# Patient Record
Sex: FEMALE | Race: WHITE | Employment: FULL TIME
[De-identification: names, ages, dates, MRNs, and addresses within clinical notes are randomized per-mention and may not be internally consistent; named-entity substitution may affect disease eponyms.]

---

## 2017-01-26 ENCOUNTER — TELEPHONE (OUTPATIENT)
Dept: PODIATRY | Facility: CLINIC | Age: 37
End: 2017-01-26

## 2017-01-26 ENCOUNTER — OFFICE VISIT (OUTPATIENT)
Dept: PODIATRY | Facility: CLINIC | Age: 37
End: 2017-01-26

## 2017-01-26 VITALS
DIASTOLIC BLOOD PRESSURE: 60 MMHG | HEIGHT: 63 IN | WEIGHT: 120 LBS | BODY MASS INDEX: 21.26 KG/M2 | SYSTOLIC BLOOD PRESSURE: 113 MMHG | HEART RATE: 81 BPM

## 2017-01-26 DIAGNOSIS — Z98.890 POST-OPERATIVE STATE: ICD-10-CM

## 2017-01-26 DIAGNOSIS — M79.671 PAIN IN RIGHT FOOT: ICD-10-CM

## 2017-01-26 DIAGNOSIS — M20.11 HALLUX ABDUCTO VALGUS, RIGHT: Primary | ICD-10-CM

## 2017-01-26 PROCEDURE — 73630 X-RAY EXAM OF FOOT: CPT | Performed by: PODIATRIST

## 2017-01-26 PROCEDURE — 99024 POSTOP FOLLOW-UP VISIT: CPT | Performed by: PODIATRIST

## 2017-01-31 ENCOUNTER — TELEPHONE (OUTPATIENT)
Dept: PODIATRY | Facility: CLINIC | Age: 37
End: 2017-01-31

## 2017-02-01 RX ORDER — OXYCODONE HYDROCHLORIDE AND ACETAMINOPHEN 5; 325 MG/1; MG/1
1 TABLET ORAL EVERY 6 HOURS PRN
Qty: 40 TABLET | Refills: 0 | Status: SHIPPED | OUTPATIENT
Start: 2017-02-01 | End: 2017-02-08

## 2017-02-06 ENCOUNTER — OFFICE VISIT (OUTPATIENT)
Dept: PODIATRY | Facility: CLINIC | Age: 37
End: 2017-02-06

## 2017-02-06 VITALS
BODY MASS INDEX: 21.26 KG/M2 | SYSTOLIC BLOOD PRESSURE: 108 MMHG | WEIGHT: 120 LBS | HEART RATE: 90 BPM | HEIGHT: 63 IN | DIASTOLIC BLOOD PRESSURE: 65 MMHG

## 2017-02-06 DIAGNOSIS — Z98.890 POST-OPERATIVE STATE: ICD-10-CM

## 2017-02-06 DIAGNOSIS — M20.11 HALLUX ABDUCTO VALGUS, RIGHT: Primary | ICD-10-CM

## 2017-02-06 PROCEDURE — 99024 POSTOP FOLLOW-UP VISIT: CPT | Performed by: PODIATRIST

## 2017-02-07 ENCOUNTER — TELEPHONE (OUTPATIENT)
Dept: PODIATRY | Facility: CLINIC | Age: 37
End: 2017-02-07

## 2017-02-22 ENCOUNTER — OFFICE VISIT (OUTPATIENT)
Dept: PODIATRY | Facility: CLINIC | Age: 37
End: 2017-02-22

## 2017-02-22 VITALS
WEIGHT: 120 LBS | HEART RATE: 75 BPM | HEIGHT: 63 IN | SYSTOLIC BLOOD PRESSURE: 108 MMHG | DIASTOLIC BLOOD PRESSURE: 67 MMHG | BODY MASS INDEX: 21.26 KG/M2

## 2017-02-22 DIAGNOSIS — Z98.890 POST-OPERATIVE STATE: ICD-10-CM

## 2017-02-22 DIAGNOSIS — M20.11 HALLUX ABDUCTO VALGUS, RIGHT: Primary | ICD-10-CM

## 2017-02-22 PROCEDURE — 99024 POSTOP FOLLOW-UP VISIT: CPT | Performed by: PODIATRIST

## 2017-03-15 ENCOUNTER — OFFICE VISIT (OUTPATIENT)
Dept: PODIATRY | Age: 37
End: 2017-03-15

## 2017-03-15 VITALS
WEIGHT: 120 LBS | DIASTOLIC BLOOD PRESSURE: 65 MMHG | HEART RATE: 94 BPM | SYSTOLIC BLOOD PRESSURE: 115 MMHG | HEIGHT: 63 IN | BODY MASS INDEX: 21.26 KG/M2

## 2017-03-15 DIAGNOSIS — Z98.890 POST-OPERATIVE STATE: ICD-10-CM

## 2017-03-15 DIAGNOSIS — M20.11 HALLUX ABDUCTO VALGUS, RIGHT: Primary | ICD-10-CM

## 2017-03-15 PROCEDURE — 99024 POSTOP FOLLOW-UP VISIT: CPT | Performed by: PODIATRIST

## 2017-04-12 ENCOUNTER — OFFICE VISIT (OUTPATIENT)
Dept: PODIATRY | Age: 37
End: 2017-04-12

## 2017-04-12 VITALS
WEIGHT: 120 LBS | DIASTOLIC BLOOD PRESSURE: 60 MMHG | HEIGHT: 63 IN | BODY MASS INDEX: 21.26 KG/M2 | SYSTOLIC BLOOD PRESSURE: 104 MMHG | HEART RATE: 81 BPM

## 2017-04-12 DIAGNOSIS — M79.671 PAIN IN RIGHT FOOT: ICD-10-CM

## 2017-04-12 DIAGNOSIS — Z98.890 POST-OPERATIVE STATE: ICD-10-CM

## 2017-04-12 DIAGNOSIS — M20.11 HALLUX ABDUCTO VALGUS, RIGHT: Primary | ICD-10-CM

## 2017-04-12 PROCEDURE — 99024 POSTOP FOLLOW-UP VISIT: CPT | Performed by: PODIATRIST

## 2017-05-11 ENCOUNTER — OFFICE VISIT (OUTPATIENT)
Dept: PODIATRY | Age: 37
End: 2017-05-11
Payer: COMMERCIAL

## 2017-05-11 VITALS
WEIGHT: 120 LBS | HEART RATE: 80 BPM | HEIGHT: 63 IN | DIASTOLIC BLOOD PRESSURE: 70 MMHG | SYSTOLIC BLOOD PRESSURE: 108 MMHG | BODY MASS INDEX: 21.26 KG/M2

## 2017-05-11 DIAGNOSIS — Z98.890 POST-OPERATIVE STATE: ICD-10-CM

## 2017-05-11 DIAGNOSIS — M20.11 HALLUX ABDUCTO VALGUS, RIGHT: Primary | ICD-10-CM

## 2017-05-11 PROCEDURE — 99213 OFFICE O/P EST LOW 20 MIN: CPT | Performed by: PODIATRIST

## 2017-06-14 ENCOUNTER — OFFICE VISIT (OUTPATIENT)
Dept: PODIATRY | Age: 37
End: 2017-06-14
Payer: COMMERCIAL

## 2017-06-14 VITALS
SYSTOLIC BLOOD PRESSURE: 121 MMHG | BODY MASS INDEX: 21.26 KG/M2 | DIASTOLIC BLOOD PRESSURE: 98 MMHG | WEIGHT: 120 LBS | HEART RATE: 78 BPM | HEIGHT: 63 IN

## 2017-06-14 DIAGNOSIS — R52 PAINFUL SCAR: ICD-10-CM

## 2017-06-14 DIAGNOSIS — M20.11 HALLUX ABDUCTO VALGUS, RIGHT: Primary | ICD-10-CM

## 2017-06-14 DIAGNOSIS — L90.5 PAINFUL SCAR: ICD-10-CM

## 2017-06-14 PROCEDURE — 99213 OFFICE O/P EST LOW 20 MIN: CPT | Performed by: PODIATRIST

## 2017-07-18 ENCOUNTER — HOSPITAL ENCOUNTER (OUTPATIENT)
Age: 37
Setting detail: SPECIMEN
Discharge: HOME | End: 2017-07-18
Payer: COMMERCIAL

## 2017-07-18 ENCOUNTER — OFFICE VISIT (OUTPATIENT)
Dept: OBGYN CLINIC | Age: 37
End: 2017-07-18
Payer: COMMERCIAL

## 2017-07-18 VITALS
HEART RATE: 76 BPM | DIASTOLIC BLOOD PRESSURE: 66 MMHG | BODY MASS INDEX: 21.09 KG/M2 | WEIGHT: 119 LBS | SYSTOLIC BLOOD PRESSURE: 102 MMHG | RESPIRATION RATE: 18 BRPM | HEIGHT: 63 IN

## 2017-07-18 DIAGNOSIS — Z30.431 IUD CHECK UP: ICD-10-CM

## 2017-07-18 DIAGNOSIS — Z12.4 CERVICAL CANCER SCREENING: ICD-10-CM

## 2017-07-18 DIAGNOSIS — Z01.419 WELL WOMAN EXAM: Primary | ICD-10-CM

## 2017-07-18 PROCEDURE — 99395 PREV VISIT EST AGE 18-39: CPT | Performed by: SPECIALIST

## 2017-07-18 ASSESSMENT — ENCOUNTER SYMPTOMS
NAUSEA: 0
CONSTIPATION: 0
VOMITING: 0
COUGH: 0
ABDOMINAL PAIN: 0
EYE PAIN: 0
DIARRHEA: 0
APNEA: 0
ABDOMINAL DISTENTION: 0

## 2017-07-20 LAB — CYTOLOGY REPORT: NORMAL

## 2017-07-25 ENCOUNTER — OFFICE VISIT (OUTPATIENT)
Dept: PODIATRY | Age: 37
End: 2017-07-25
Payer: COMMERCIAL

## 2017-07-25 VITALS
DIASTOLIC BLOOD PRESSURE: 68 MMHG | HEIGHT: 63 IN | WEIGHT: 120 LBS | HEART RATE: 79 BPM | BODY MASS INDEX: 21.26 KG/M2 | SYSTOLIC BLOOD PRESSURE: 108 MMHG

## 2017-07-25 DIAGNOSIS — R52 PAINFUL SCAR: ICD-10-CM

## 2017-07-25 DIAGNOSIS — L90.5 PAINFUL SCAR: ICD-10-CM

## 2017-07-25 DIAGNOSIS — M20.11 HALLUX ABDUCTO VALGUS, RIGHT: Primary | ICD-10-CM

## 2017-07-25 PROCEDURE — 99212 OFFICE O/P EST SF 10 MIN: CPT | Performed by: PODIATRIST

## 2017-07-25 ASSESSMENT — ENCOUNTER SYMPTOMS
VOMITING: 0
DIARRHEA: 0
CONSTIPATION: 0
NAUSEA: 0
COLOR CHANGE: 0

## 2018-07-24 ENCOUNTER — HOSPITAL ENCOUNTER (OUTPATIENT)
Age: 38
Setting detail: SPECIMEN
Discharge: HOME | End: 2018-07-24
Payer: COMMERCIAL

## 2018-07-24 ENCOUNTER — OFFICE VISIT (OUTPATIENT)
Dept: OBGYN CLINIC | Age: 38
End: 2018-07-24
Payer: COMMERCIAL

## 2018-07-24 VITALS
HEIGHT: 63 IN | BODY MASS INDEX: 22.68 KG/M2 | HEART RATE: 79 BPM | DIASTOLIC BLOOD PRESSURE: 68 MMHG | SYSTOLIC BLOOD PRESSURE: 111 MMHG | RESPIRATION RATE: 20 BRPM | WEIGHT: 128 LBS

## 2018-07-24 DIAGNOSIS — Z01.419 WOMEN'S ANNUAL ROUTINE GYNECOLOGICAL EXAMINATION: Primary | ICD-10-CM

## 2018-07-24 DIAGNOSIS — Z12.39 BREAST CANCER SCREENING: ICD-10-CM

## 2018-07-24 DIAGNOSIS — Z12.4 PAP SMEAR FOR CERVICAL CANCER SCREENING: ICD-10-CM

## 2018-07-24 PROCEDURE — 99396 PREV VISIT EST AGE 40-64: CPT | Performed by: CLINICAL NURSE SPECIALIST

## 2018-07-24 NOTE — PROGRESS NOTES
[address] OB GYN  [facility] [address]  [address] [address]  Dept: [phone]        DATE OF VISIT:  18        History and Physical    Kimberly Hawk    :  1980  CHIEF COMPLAINT:    Chief Complaint   Patient presents with    Annual Exam     Pt is here today for annual exam. She is not having any problems at this time. Pt denies STD testing. HPI :   Kimberly Hawk is a 34 y.o. female who presents for annual well woman exam with pap, declined STD screening. The patient was seen and examined. Per the patient bowels are regular. She has no voiding complaints.   She denies any bloating as well as vaginal discharge.  _____________________________________________________________________  Past Medical History:   Diagnosis Date    Chronic headaches     Hallux valgus of right foot     Prolonged emergence from general anesthesia     AS A CHILD AFTER TONSILLECTOMY    Sesamoiditis     right foot                                                                   Past Surgical History:   Procedure Laterality Date    BUNIONECTOMY Right 10/5/15    silver; tibial sesamoidectomy right foot    BUNIONECTOMY Right 2017    marcela,akin and avulsion of right great toe nail    NERVE BLOCK      had ten done multiple dates, young age, right arm    OTHER SURGICAL HISTORY  2015    IUD removal    TOENAIL EXCISION Bilateral years ago    Hallux nail, great toes    TONSILLECTOMY AND ADENOIDECTOMY      TONSILLECTOMY AND ADENOIDECTOMY       Family History   Problem Relation Age of Onset    Colon Polyps Mother     Emphysema Father     Diabetes Maternal Grandmother     High Blood Pressure Maternal Grandmother     Diabetes Paternal Grandfather     High Blood Pressure Paternal Grandfather     Cancer Paternal Grandfather         unsure of type    Breast Cancer Maternal Aunt     Colon Cancer Maternal Aunt      History   Smoking Status    Never Smoker

## 2018-07-25 LAB
HPV SAMPLE: NORMAL
HPV SOURCE: NORMAL
HPV, GENOTYPE 16: NOT DETECTED
HPV, GENOTYPE 18: NOT DETECTED
HPV, HIGH RISK OTHER: NOT DETECTED
HPV, INTERPRETATION: NORMAL

## 2018-08-08 ENCOUNTER — TELEPHONE (OUTPATIENT)
Dept: OBGYN CLINIC | Age: 38
End: 2018-08-08

## 2018-08-08 LAB — CYTOLOGY REPORT: NORMAL

## 2018-08-08 RX ORDER — NORETHINDRONE ACETATE AND ETHINYL ESTRADIOL 1MG-20(21)
1 KIT ORAL DAILY
Qty: 1 PACKET | Refills: 3 | Status: SHIPPED | OUTPATIENT
Start: 2018-08-08 | End: 2021-11-30

## 2018-10-08 ENCOUNTER — HOSPITAL ENCOUNTER (OUTPATIENT)
Dept: WOMENS IMAGING | Age: 38
Discharge: HOME | End: 2018-10-10
Payer: COMMERCIAL

## 2018-10-08 DIAGNOSIS — Z12.39 BREAST CANCER SCREENING: ICD-10-CM

## 2018-10-08 DIAGNOSIS — Z01.419 WOMEN'S ANNUAL ROUTINE GYNECOLOGICAL EXAMINATION: ICD-10-CM

## 2018-10-08 PROCEDURE — 77063 BREAST TOMOSYNTHESIS BI: CPT

## 2019-08-01 ENCOUNTER — OFFICE VISIT (OUTPATIENT)
Dept: OBGYN CLINIC | Age: 39
End: 2019-08-01
Payer: COMMERCIAL

## 2019-08-01 ENCOUNTER — HOSPITAL ENCOUNTER (OUTPATIENT)
Age: 39
Setting detail: SPECIMEN
Discharge: HOME | End: 2019-08-01
Payer: COMMERCIAL

## 2019-08-01 VITALS
DIASTOLIC BLOOD PRESSURE: 65 MMHG | HEIGHT: 63 IN | HEART RATE: 76 BPM | WEIGHT: 133 LBS | SYSTOLIC BLOOD PRESSURE: 113 MMHG | BODY MASS INDEX: 23.57 KG/M2

## 2019-08-01 DIAGNOSIS — Z12.39 SCREENING FOR BREAST CANCER: ICD-10-CM

## 2019-08-01 DIAGNOSIS — Z30.431 IUD CHECK UP: ICD-10-CM

## 2019-08-01 DIAGNOSIS — Z01.419 WELL WOMAN EXAM: Primary | ICD-10-CM

## 2019-08-01 PROCEDURE — 99386 PREV VISIT NEW AGE 40-64: CPT | Performed by: SPECIALIST

## 2019-08-01 ASSESSMENT — ENCOUNTER SYMPTOMS
DIARRHEA: 0
APNEA: 0
VOMITING: 0
ABDOMINAL DISTENTION: 0
NAUSEA: 0
CONSTIPATION: 0
EYE PAIN: 0
ABDOMINAL PAIN: 0
COUGH: 0

## 2019-08-06 LAB — CYTOLOGY REPORT: NORMAL

## 2019-08-27 ENCOUNTER — TELEPHONE (OUTPATIENT)
Dept: OBGYN CLINIC | Age: 39
End: 2019-08-27

## 2019-08-27 DIAGNOSIS — N89.8 VAGINA ITCHING: ICD-10-CM

## 2019-08-27 DIAGNOSIS — N89.8 VAGINAL DISCHARGE: Primary | ICD-10-CM

## 2019-08-27 RX ORDER — METRONIDAZOLE 500 MG/1
500 TABLET ORAL 2 TIMES DAILY
Qty: 14 TABLET | Refills: 0 | Status: SHIPPED | OUTPATIENT
Start: 2019-08-27 | End: 2019-09-03

## 2019-08-27 RX ORDER — FLUCONAZOLE 100 MG/1
100 TABLET ORAL DAILY
Qty: 7 TABLET | Refills: 0 | Status: SHIPPED | OUTPATIENT
Start: 2019-08-27 | End: 2019-09-03

## 2019-10-24 ENCOUNTER — HOSPITAL ENCOUNTER (OUTPATIENT)
Dept: WOMENS IMAGING | Age: 39
Discharge: HOME | End: 2019-10-26
Payer: COMMERCIAL

## 2019-10-24 DIAGNOSIS — Z12.39 SCREENING FOR BREAST CANCER: ICD-10-CM

## 2019-10-24 PROCEDURE — 77063 BREAST TOMOSYNTHESIS BI: CPT

## 2019-11-14 ENCOUNTER — OFFICE VISIT (OUTPATIENT)
Dept: PODIATRY | Age: 39
End: 2019-11-14
Payer: COMMERCIAL

## 2019-11-14 VITALS — HEIGHT: 63 IN | BODY MASS INDEX: 23.57 KG/M2 | WEIGHT: 133 LBS

## 2019-11-14 DIAGNOSIS — M20.11 HALLUX VALGUS OF RIGHT FOOT: Primary | ICD-10-CM

## 2019-11-14 DIAGNOSIS — M25.80 SESAMOIDITIS: ICD-10-CM

## 2019-11-14 DIAGNOSIS — M79.671 PAIN IN RIGHT FOOT: ICD-10-CM

## 2019-11-14 PROCEDURE — G8484 FLU IMMUNIZE NO ADMIN: HCPCS | Performed by: PODIATRIST

## 2019-11-14 PROCEDURE — 1036F TOBACCO NON-USER: CPT | Performed by: PODIATRIST

## 2019-11-14 PROCEDURE — G8420 CALC BMI NORM PARAMETERS: HCPCS | Performed by: PODIATRIST

## 2019-11-14 PROCEDURE — G8427 DOCREV CUR MEDS BY ELIG CLIN: HCPCS | Performed by: PODIATRIST

## 2019-11-14 PROCEDURE — 99213 OFFICE O/P EST LOW 20 MIN: CPT | Performed by: PODIATRIST

## 2019-11-14 ASSESSMENT — ENCOUNTER SYMPTOMS
CONSTIPATION: 0
VOMITING: 0
COLOR CHANGE: 0
DIARRHEA: 0
NAUSEA: 0

## 2020-09-08 ENCOUNTER — TELEPHONE (OUTPATIENT)
Dept: OBGYN CLINIC | Age: 40
End: 2020-09-08

## 2020-09-08 RX ORDER — METRONIDAZOLE 500 MG/1
500 TABLET ORAL 2 TIMES DAILY
Qty: 14 TABLET | Refills: 0 | Status: SHIPPED | OUTPATIENT
Start: 2020-09-08 | End: 2020-09-15

## 2020-09-08 RX ORDER — FLUCONAZOLE 100 MG/1
100 TABLET ORAL DAILY
Qty: 7 TABLET | Refills: 0 | Status: SHIPPED | OUTPATIENT
Start: 2020-09-08 | End: 2022-07-27 | Stop reason: SDUPTHER

## 2020-10-01 ENCOUNTER — TELEPHONE (OUTPATIENT)
Dept: OBGYN CLINIC | Age: 40
End: 2020-10-01

## 2020-10-12 ENCOUNTER — OFFICE VISIT (OUTPATIENT)
Dept: OBGYN CLINIC | Age: 40
End: 2020-10-12
Payer: COMMERCIAL

## 2020-10-12 ENCOUNTER — HOSPITAL ENCOUNTER (OUTPATIENT)
Age: 40
Setting detail: SPECIMEN
Discharge: HOME | End: 2020-10-12
Payer: COMMERCIAL

## 2020-10-12 VITALS
HEIGHT: 63 IN | BODY MASS INDEX: 23.21 KG/M2 | DIASTOLIC BLOOD PRESSURE: 69 MMHG | HEART RATE: 72 BPM | WEIGHT: 131 LBS | TEMPERATURE: 98.2 F | SYSTOLIC BLOOD PRESSURE: 108 MMHG

## 2020-10-12 PROBLEM — Z12.31 ENCOUNTER FOR SCREENING MAMMOGRAM FOR MALIGNANT NEOPLASM OF BREAST: Status: ACTIVE | Noted: 2020-10-12

## 2020-10-12 PROBLEM — Z01.419 WOMEN'S ANNUAL ROUTINE GYNECOLOGICAL EXAMINATION: Status: ACTIVE | Noted: 2020-10-12

## 2020-10-12 PROCEDURE — G8484 FLU IMMUNIZE NO ADMIN: HCPCS | Performed by: SPECIALIST

## 2020-10-12 PROCEDURE — 99396 PREV VISIT EST AGE 40-64: CPT | Performed by: SPECIALIST

## 2020-10-13 ENCOUNTER — TELEPHONE (OUTPATIENT)
Dept: OBGYN CLINIC | Age: 40
End: 2020-10-13

## 2020-10-13 ASSESSMENT — ENCOUNTER SYMPTOMS
APNEA: 0
ABDOMINAL PAIN: 0
DIARRHEA: 0
ABDOMINAL DISTENTION: 0
NAUSEA: 0
CONSTIPATION: 0
VOMITING: 0
COUGH: 0
EYE PAIN: 0

## 2020-10-13 NOTE — TELEPHONE ENCOUNTER
Faxed received from [address] [place] [address].   Diagnosis on mammogram orders needs to be changed to abnormal mammogram.  Thanks

## 2020-10-13 NOTE — PROGRESS NOTES
Subjective:      Patient ID: Raymundo Lesch is a 41 y.o. female. Chief Complaint   Patient presents with   [facility] Gynecologic Exam     Patient is here for pap today      /69 (Site: Right Upper Arm, Position: Sitting, Cuff Size: Medium Adult)   Pulse 72   Temp 98.2 °F (36.8 °C) (Temporal)   Ht 5' 3\" (1.6 m)   Wt 131 lb (59.4 kg)   BMI 23.21 kg/m²   No LMP recorded.     [id number]    Past Medical History:   Diagnosis Date    Chronic headaches     Hallux valgus of right foot     Prolonged emergence from general anesthesia     AS A CHILD AFTER TONSILLECTOMY    Sesamoiditis     right foot     Current Outpatient Medications Ordered in Epic   Medication Sig Dispense Refill    PARAGARD INTRAUTERINE COPPER IUD by Intrauterine route      norethindrone-ethinyl estradiol (LOESTRIN FE 1/20) 1-20 MG-MCG per tablet Take 1 tablet by mouth daily (Patient not taking: Reported on 11/14/2019) 1 packet 3     Current Facility-Administered Medications Ordered in Epic   Medication Dose Route Frequency Provider Last Rate Last Dose    bupivacaine (MARCAINE) 0.5 % injection 5 mg  1 mL Intradermal Once Wilhelmenia Westmoreland, DPM        Dexamethasone Sodium Phosphate injection 6 mg  6 mg Intra-articular Once Wilhelmenia Westmoreland, DPM        MethylPREDNISolone Acetate SUSP 10 mg  10 mg Injection Once Wilhelmenia Mynor, DPM         Problem List Items Addressed This Visit     Encounter for screening mammogram for malignant neoplasm of breast - Primary    Relevant Orders    MERON DIGITAL SCREEN W OR WO CAD BILATERAL      Other Visit Diagnoses     IUD check up            Allergies   Allergen Reactions    Bactrim [Sulfamethoxazole-Trimethoprim]     Hibiclens [Chlorhexidine Gluconate] Hives    Minocycline Hives    Penicillins     Iodine Itching and Rash     Orders Placed This Encounter   Procedures    MERON DIGITAL SCREEN W OR WO CAD BILATERAL     Standing Status:   Future     Standing Expiration Date:   12/11/2021     Order Specific Question:   Reason for exam:     Answer:   screen for breast cancer    PAP Smear     Patient History:    No LMP recorded. OBGYN Status: Having periods  Past Surgical History:  10/5/15: BUNIONECTOMY; Right      Comment:  silver; tibial sesamoidectomy right foot  01/23/2017: BUNIONECTOMY; Right      Comment:  marcela,akin and avulsion of right great toe nail  No date: NERVE BLOCK      Comment:  had ten done multiple dates, young age, right arm  Feb 2015: OTHER SURGICAL HISTORY      Comment:  IUD removal  years ago: TOENAIL EXCISION; Bilateral      Comment:  Hallux nail, great toes  No date: TONSILLECTOMY AND ADENOIDECTOMY  No date: TONSILLECTOMY AND ADENOIDECTOMY  Medications/Contraceptives Affecting Cytology     Combination Contraceptives - Oral Disp Start End     norethindrone-ethinyl estradiol ([address] 1/20) 1-20 MG-MCG per   tablet    1 packet 8/8/2018     Sig: Take 1 tablet by mouth daily    Patient not taking:  Reported on 11/14/2019       Route: Oral        Social History    Tobacco Use      Smoking status: Never Smoker      Smokeless tobacco: Never Used       Standing Status:   Future     Standing Expiration Date:   10/12/2021     Order Specific Question:   Collection Type     Answer: Thin Prep     Order Specific Question:   Prior Abnormal Pap Test     Answer:   No     Order Specific Question:   Screening or Diagnostic     Answer:   Screening     Order Specific Question:   HPV Requested? Answer:   Yes     Order Specific Question:   High Risk Patient     Answer:   N/A        Patient is here for her annual exam today. Her last pap smear in August 2019 was negative. She uses birth control pills. She is not a smoker. Review of Systems   Constitutional: Negative for activity change, appetite change and fever. HENT: Negative for ear discharge and ear pain. Eyes: Negative for pain and visual disturbance. Respiratory: Negative for apnea and cough.     Cardiovascular: Negative for chest pain, palpitations and leg swelling. Gastrointestinal: Negative for abdominal distention, abdominal pain, constipation, diarrhea, nausea and vomiting. Endocrine: Negative. Genitourinary: Negative for difficulty urinating, dysuria, menstrual problem and pelvic pain. Musculoskeletal: Negative for neck pain and neck stiffness. Skin: Negative. Neurological: Negative for light-headedness and numbness. Hematological: Negative. Does not bruise/bleed easily. Objective:   Physical Exam  Vitals signs and nursing note reviewed. Exam conducted with a chaperone present. Constitutional:       Appearance: She is well-developed. HENT:      Head: Normocephalic and atraumatic. Neck:      Thyroid: No thyroid mass or thyromegaly. Cardiovascular:      Rate and Rhythm: Normal rate and regular rhythm. Pulmonary:      Effort: Pulmonary effort is normal.      Breath sounds: Normal breath sounds. Chest:      Breasts:         Right: Normal. No inverted nipple, mass, nipple discharge, skin change or tenderness. Left: Normal. No inverted nipple, mass, nipple discharge, skin change or tenderness. Abdominal:      General: Bowel sounds are normal. There is no distension. Palpations: Abdomen is soft. There is no mass. Tenderness: There is no abdominal tenderness. There is no guarding or rebound. Hernia: There is no hernia in the left inguinal area. Genitourinary:     General: Normal vulva. Exam position: Supine. Labia:         Right: No rash or lesion. Left: No rash or lesion. Vagina: Normal. No signs of injury. No vaginal discharge or tenderness. Cervix: No cervical motion tenderness or discharge. Uterus: Normal. Not enlarged, not fixed and not tender. Adnexa: Right adnexa normal and left adnexa normal.        Right: No mass or tenderness. Left: No mass or tenderness. Rectum: Normal. No mass or anal fissure. Normal anal tone. Comments: IUD strings visualized  Musculoskeletal: Normal range of motion. General: No tenderness. Skin:     General: Skin is warm and dry. Neurological:      Mental Status: She is alert and oriented to person, place, and time. Psychiatric:         Judgment: Judgment normal.         Assessment:      Patient with normal annual exam.   Pap smear was done. IUD strings were visualized on exam. Patient was advised to have mammogram done. Plan:      Orders Placed This Encounter   Procedures    MERON DIGITAL SCREEN W OR WO CAD BILATERAL    PAP Smear     Appointment in 1 year. Diann Singh am scribing for, and in the presence of Dr. Rafaela Jenkins. Electronically signed by: Alecia Daniels 10/15/20 3:20 PM       I agree to the above documentation placed by my scribe Alecia Daniels. I reviewed the scribe's note and agree with the documented findings and plan of care. Any areas of disagreement are noted on the chart. I have personally evaluated this patient. Additional findings are as noted. I agree with the chief complaint, past medical history, past surgical history, allergies, medications, social and family history as documented unless otherwise noted below.      Electronically signed by Rafaela Jenkins MD on 10/16/2020 at 3:37 AM

## 2020-10-16 PROBLEM — Z30.431 IUD CHECK UP: Status: ACTIVE | Noted: 2020-10-16

## 2020-10-20 LAB
CYTOLOGY REPORT: NORMAL
HPV SOURCE: NORMAL
HPV, GENOTYPE 16: NOT DETECTED
HPV, GENOTYPE 18: NOT DETECTED
HPV, HIGH RISK OTHER: NOT DETECTED

## 2020-11-11 PROBLEM — Z12.31 ENCOUNTER FOR SCREENING MAMMOGRAM FOR MALIGNANT NEOPLASM OF BREAST: Status: RESOLVED | Noted: 2020-10-12 | Resolved: 2020-11-11

## 2020-12-05 ENCOUNTER — OFFICE VISIT (OUTPATIENT)
Dept: PRIMARY CARE CLINIC | Age: 40
End: 2020-12-05
Payer: COMMERCIAL

## 2020-12-05 VITALS
OXYGEN SATURATION: 98 % | HEART RATE: 87 BPM | DIASTOLIC BLOOD PRESSURE: 88 MMHG | TEMPERATURE: 98.7 F | SYSTOLIC BLOOD PRESSURE: 144 MMHG

## 2020-12-05 PROCEDURE — 1036F TOBACCO NON-USER: CPT | Performed by: NURSE PRACTITIONER

## 2020-12-05 PROCEDURE — G8420 CALC BMI NORM PARAMETERS: HCPCS | Performed by: NURSE PRACTITIONER

## 2020-12-05 PROCEDURE — G8484 FLU IMMUNIZE NO ADMIN: HCPCS | Performed by: NURSE PRACTITIONER

## 2020-12-05 PROCEDURE — 99214 OFFICE O/P EST MOD 30 MIN: CPT | Performed by: NURSE PRACTITIONER

## 2020-12-05 PROCEDURE — G8427 DOCREV CUR MEDS BY ELIG CLIN: HCPCS | Performed by: NURSE PRACTITIONER

## 2020-12-05 PROCEDURE — 96372 THER/PROPH/DIAG INJ SC/IM: CPT | Performed by: NURSE PRACTITIONER

## 2020-12-05 RX ORDER — PREDNISONE 20 MG/1
40 TABLET ORAL DAILY
Qty: 10 TABLET | Refills: 0 | Status: SHIPPED | OUTPATIENT
Start: 2020-12-05 | End: 2020-12-10

## 2020-12-05 RX ORDER — TRIAMCINOLONE ACETONIDE 40 MG/ML
40 INJECTION, SUSPENSION INTRA-ARTICULAR; INTRAMUSCULAR ONCE
Status: COMPLETED | OUTPATIENT
Start: 2020-12-05 | End: 2020-12-05

## 2020-12-05 RX ADMIN — TRIAMCINOLONE ACETONIDE 40 MG: 40 INJECTION, SUSPENSION INTRA-ARTICULAR; INTRAMUSCULAR at 11:05

## 2020-12-05 ASSESSMENT — ENCOUNTER SYMPTOMS
ALLERGIC/IMMUNOLOGIC NEGATIVE: 1
CHEST TIGHTNESS: 0
EYES NEGATIVE: 1
ABDOMINAL PAIN: 0
SORE THROAT: 0
ALLERGIC REACTION: 1
DIARRHEA: 0
COUGH: 0
SHORTNESS OF BREATH: 0
NAUSEA: 0
EYE DISCHARGE: 0
VOMITING: 0
EYE ITCHING: 0

## 2020-12-05 ASSESSMENT — PATIENT HEALTH QUESTIONNAIRE - PHQ9
SUM OF ALL RESPONSES TO PHQ QUESTIONS 1-9: 0
SUM OF ALL RESPONSES TO PHQ QUESTIONS 1-9: 0
2. FEELING DOWN, DEPRESSED OR HOPELESS: 0
1. LITTLE INTEREST OR PLEASURE IN DOING THINGS: 0
SUM OF ALL RESPONSES TO PHQ9 QUESTIONS 1 & 2: 0
SUM OF ALL RESPONSES TO PHQ QUESTIONS 1-9: 0

## 2020-12-05 NOTE — PATIENT INSTRUCTIONS
bleeding. · Follow your doctor's instructions for how to stop taking this medicine. You may need to taper it. This means the medicine should be slowly reduced. Do not stop taking the medicine all at once. When should you call for help? Call 911 if:    · You vomit blood or what looks like coffee grounds. Call your doctor now or seek immediate medical care if:    · Your symptoms are getting worse.     · You are dizzy or lightheaded, or you feel like you may faint.     · You have new or worse nausea or vomiting.     · You have stomach pain that is getting worse.     · Your stools are black. Watch closely for changes in your health, and be sure to contact your doctor if:    · You do not get better as expected. Where can you learn more? Go to [email or web address]. org and sign in to your [employer] account. Enter J304 in the [employer] box to learn more about \"Oral Corticosteroids: Care Instructions. \"     If you do not have an account, please click on the \"Sign Up Now\" link. Current as of: February 24, 2020               Content Version: 12.6  © 4469-6032 [employer]. Care instructions adapted under license by [facility] ([place] [facility]). If you have questions about a medical condition or this instruction, always ask your healthcare professional. [address] any warranty or liability for your use of this information.

## 2020-12-05 NOTE — PROGRESS NOTES
[address]  [address] [facility] [address] [address]  Dept: [phone]  Dept Fax: [phone]    Flakita David is a 41 y.o. female who presents today forher medical conditions/complaints as noted below. Flakita David is c/o of   Chief Complaint   Patient presents with    Allergic Reaction     hives x 1 day; used essential oil on thursday, hives started friday morning      HPI:     Allergic Reaction   This is a new problem. The current episode started yesterday. The problem occurs constantly. The problem is unchanged. The problem is moderate. Associated with: essential oil  The time of exposure was  in time (Applied essentail oil on Thursday- developed hives 1 day after ) from the onset of symptoms. The exposure occurred at home. Associated symptoms include a rash. Pertinent negatives include no abdominal pain, chest pain, coughing, diarrhea, eye itching or vomiting. There is no swelling present. Past treatments include diphenhydramine (Benadryl PO and Topical ). The treatment provided mild relief.      Past Medical History:   Diagnosis Date    Chronic headaches     Hallux valgus of right foot     Prolonged emergence from general anesthesia     AS A CHILD AFTER TONSILLECTOMY    Sesamoiditis     right foot      Past Surgical History:   Procedure Laterality Date    BUNIONECTOMY Right 10/5/15    silver; tibial sesamoidectomy right foot    BUNIONECTOMY Right 01/23/2017    marcela,akin and avulsion of right great toe nail    NERVE BLOCK      had ten done multiple dates, young age, right arm    OTHER SURGICAL HISTORY  Feb 2015    IUD removal    TOENAIL EXCISION Bilateral years ago    Hallux nail, great toes    TONSILLECTOMY AND ADENOIDECTOMY      TONSILLECTOMY AND ADENOIDECTOMY         Family History   Problem Relation Age of Onset    Colon Polyps Mother     Emphysema Father     Diabetes Maternal Grandmother     High Blood Pressure Maternal Grandmother     Diabetes Paternal Grandfather     High Blood Pressure Paternal Grandfather     Cancer Paternal Grandfather         unsure of type    Breast Cancer Maternal Aunt     Colon Cancer Maternal Aunt        Social History     Tobacco Use    Smoking status: Never Smoker    Smokeless tobacco: Never Used   Substance Use Topics    Alcohol use: Yes     Comment: OCCASIONALLY      Current Outpatient Medications   Medication Sig Dispense Refill    predniSONE (DELTASONE) 20 MG tablet Take 2 tablets by mouth daily for 5 days 10 tablet 0    hydrocortisone 2.5 % ointment Apply topically 2 times daily while symptoms persist 30 g 0    PARAGARD INTRAUTERINE COPPER IUD by Intrauterine route      norethindrone-ethinyl estradiol (LOESTRIN FE 1/20) 1-20 MG-MCG per tablet Take 1 tablet by mouth daily (Patient not taking: Reported on 11/14/2019) 1 packet 3     Current Facility-Administered Medications   Medication Dose Route Frequency Provider Last Rate Last Dose    bupivacaine (MARCAINE) 0.5 % injection 5 mg  1 mL Intradermal Once Elias Villalobos DPM        Dexamethasone Sodium Phosphate injection 6 mg  6 mg Intra-articular Once Elias Villalobos DPM        MethylPREDNISolone Acetate SUSP 10 mg  10 mg Injection Once Elias Villalobos DPM            Allergies   Allergen Reactions    Bactrim [Sulfamethoxazole-Trimethoprim]     Hibiclens [Chlorhexidine Gluconate] Hives    Minocycline Hives    Penicillins     Iodine Itching and Rash           Subjective:      Review of Systems   Constitutional: Negative for appetite change, chills, fatigue and fever. HENT: Negative for congestion, postnasal drip and sore throat. Eyes: Negative. Negative for discharge and itching. Respiratory: Negative for cough, chest tightness and shortness of breath. Cardiovascular: Negative for chest pain, palpitations and leg swelling. Gastrointestinal: Negative for abdominal pain, diarrhea, nausea and vomiting. normal and symmetric. Psychiatric:         Thought Content: Thought content normal.       BP (!) 144/88 (Site: Left Upper Arm, Position: Sitting, Cuff Size: Medium Adult)   Pulse 87   Temp 98.7 °F (37.1 °C) (Oral)   SpO2 98%     Assessment:       Diagnosis Orders   1. Allergic urticaria  triamcinolone acetonide (KENALOG-40) injection 40 mg   2. Rash  predniSONE (DELTASONE) 20 MG tablet    hydrocortisone 2.5 % ointment    triamcinolone acetonide (KENALOG-40) injection 40 mg           Plan:     1.) Kenalog IM injection in office   2.) Start Prednisone today   3.) Hydrocortisone PRN   4.) Watch for secondary skin infection - if occurs will RTO   5.) Continue with Benadryl PRN while symptoms persist     Problem List     None           Patient given educationalmaterials - see patient instructions. Discussed use, benefit, and side effectsof prescribed medications. All patient questions answered. Pt verbalized understanding. Instructed to continue current medications, diet and exercise. Patient agreedwith treatment plan. Follow up as directed.      Electronically signed by ABEL Infante CNP on 12/5/2020 at 11:15 AM

## 2020-12-07 ENCOUNTER — TELEPHONE (OUTPATIENT)
Dept: PRIMARY CARE CLINIC | Age: 40
End: 2020-12-07

## 2020-12-07 RX ORDER — HYDROXYZINE HYDROCHLORIDE 25 MG/1
25 TABLET, FILM COATED ORAL EVERY 8 HOURS PRN
Qty: 30 TABLET | Refills: 0 | Status: SHIPPED | OUTPATIENT
Start: 2020-12-07 | End: 2020-12-17

## 2020-12-07 NOTE — TELEPHONE ENCOUNTER
Patient states she is still extremely itchy from the hives due to an allergic reaction that she was seen for on 12/5/2020. Patient is wondering if she needs to be re-seen for it, or what she should do. Please advise.

## 2020-12-07 NOTE — TELEPHONE ENCOUNTER
Patient would like that sent to [facility] on [facility]. She said the hives are slightly better, but do worsen again at night and when showering. They are not spreading.

## 2020-12-07 NOTE — TELEPHONE ENCOUNTER
I can RX Atarax for her for itching. She can take this instead of Benadryl. Are the hives spreading? Did they improve at all?   If the atarax doesn't help- she may need to be reevaluated

## 2021-01-07 ENCOUNTER — HOSPITAL ENCOUNTER (OUTPATIENT)
Dept: WOMENS IMAGING | Age: 41
Discharge: HOME | End: 2021-01-09
Payer: COMMERCIAL

## 2021-01-07 DIAGNOSIS — Z12.31 ENCOUNTER FOR SCREENING MAMMOGRAM FOR MALIGNANT NEOPLASM OF BREAST: ICD-10-CM

## 2021-01-07 PROCEDURE — 77063 BREAST TOMOSYNTHESIS BI: CPT

## 2021-11-30 ENCOUNTER — OFFICE VISIT (OUTPATIENT)
Dept: OBGYN CLINIC | Age: 41
End: 2021-11-30
Payer: COMMERCIAL

## 2021-11-30 ENCOUNTER — HOSPITAL ENCOUNTER (OUTPATIENT)
Age: 41
Setting detail: SPECIMEN
Discharge: HOME | End: 2021-11-30

## 2021-11-30 VITALS
HEART RATE: 74 BPM | HEIGHT: 63 IN | BODY MASS INDEX: 24.27 KG/M2 | SYSTOLIC BLOOD PRESSURE: 110 MMHG | DIASTOLIC BLOOD PRESSURE: 78 MMHG | WEIGHT: 137 LBS

## 2021-11-30 DIAGNOSIS — Z11.51 SCREENING FOR HUMAN PAPILLOMAVIRUS: ICD-10-CM

## 2021-11-30 DIAGNOSIS — N76.0 ACUTE VAGINITIS: ICD-10-CM

## 2021-11-30 DIAGNOSIS — Z12.31 ENCOUNTER FOR SCREENING MAMMOGRAM FOR BREAST CANCER: ICD-10-CM

## 2021-11-30 DIAGNOSIS — Z01.419 PAP SMEAR, AS PART OF ROUTINE GYNECOLOGICAL EXAMINATION: Primary | ICD-10-CM

## 2021-11-30 PROCEDURE — G8484 FLU IMMUNIZE NO ADMIN: HCPCS | Performed by: CLINICAL NURSE SPECIALIST

## 2021-11-30 PROCEDURE — 99396 PREV VISIT EST AGE 40-64: CPT | Performed by: CLINICAL NURSE SPECIALIST

## 2021-11-30 RX ORDER — FLUCONAZOLE 150 MG/1
TABLET ORAL
Qty: 3 TABLET | Refills: 1 | Status: SHIPPED | OUTPATIENT
Start: 2021-11-30 | End: 2022-01-17 | Stop reason: ALTCHOICE

## 2021-11-30 RX ORDER — PAROXETINE HYDROCHLORIDE 20 MG/1
TABLET, FILM COATED ORAL
COMMUNITY
Start: 2021-11-19

## 2021-11-30 NOTE — PROGRESS NOTES
[facility]  [address] [address]  Dept: [phone]        DATE OF VISIT:  21        History and Physical    Aj Rios    :  1980  CHIEF COMPLAINT:    Chief Complaint   Patient presents with    Annual Exam                    Aj Rios is a 35 y.o. female who presents for annual well woman exam.  Patient reports that she has noticed some vaginal itching and started taking some monistat over the counter. The patient was seen and examined. Per the patient bowels areregular. She has no voiding complaints. She denies any bloating as well as vaginal discharge. Chaperone for Intimate Exam   Chaperone was offered as part of the rooming process. Patient declined and agrees to continue with exam without a chaperone.    Chaperone: none     _____________________________________________________________________  Past Medical History:   Diagnosis Date    Chronic headaches     Hallux valgus of right foot     Prolonged emergence from general anesthesia     AS A CHILD AFTER TONSILLECTOMY    Sesamoiditis     right foot                                                                   Past Surgical History:   Procedure Laterality Date    BUNIONECTOMY Right 10/5/15    silver; tibial sesamoidectomy right foot    BUNIONECTOMY Right 2017    marcela,akin and avulsion of right great toe nail    NERVE BLOCK      had ten done multiple dates, young age, right arm    OTHER SURGICAL HISTORY  2015    IUD removal    TOENAIL EXCISION Bilateral years ago    Hallux nail, great toes    TONSILLECTOMY AND ADENOIDECTOMY      TONSILLECTOMY AND ADENOIDECTOMY       Family History   Problem Relation Age of Onset    Colon Polyps Mother     Emphysema Father     Diabetes Maternal Grandmother     High Blood Pressure Maternal Grandmother     Diabetes Paternal Grandfather     High Blood Pressure Paternal Grandfather     Cancer Paternal no     Respiratory:  Shortness of breath, coughing           no    Cardiovascular: Chest pain   no     Gastrointestinal: Chronic diarrhea/constipation, nausea/vomiting           no   Urogenital:  Urinary incontinence, frequency, urgency          no                                         Heavy/irregular periods           no                                      Vaginal discharge      Yellow discharge with itching       yes  Hematological: Bruises easy   no     Endocrine:  Hot flashes ocassional night sweats  no     Hot/Cold Intolerance  no    Psychological:            Mood and affect were within normal limits. no                 Physical Exam    Physical Exam:    Vitals:    11/30/21 1315   BP: 110/78   Site: Right Upper Arm   Position: Sitting   Cuff Size: Medium Adult   Pulse: 74   Weight: 137 lb (62.1 kg)   Height: 5' 3\" (1.6 m)       General Appearance: This  is a well developed, well nourished, well groomed female. Her BMI was reviewed. Nutritional decision making andexercise were discussed. Neurological:  The patient is alert and oriented to time,place, person, and situation    Skin:  A brief inspection of the skin revealed no rashes or lesions. Neck:  The neck was supple. Respiratory: There was unlabored respiratory effort. Lungs clear to ascultation. Cardiovascular: The patients extremities were without calf tenderness or edema. Heart with a regular rate and rhythm. Abdomen: The abdomen was soft and non-tender with no guarding, rebound or rigidity. No hernias were appreciated. Breast:   The patients breasts were symmetrical.  There were no masses, discharge or retractions noted. Self breast exams were reviewed. Pelvic Exam:  The external genitalia was with a normal appearance. The vaginal vault was normal. There were no cystocele, rectocele, or enterocele appreciated.  There was yellow vaginal discharge, and some discharge is like \"cottage cheese\". The cervix was without lesions. There was no cervical motion tenderness. IUD strings noted. The uterus was mobile, midline and regular. The adnexa no fullness, tenderness or masses appreciated. ASSESSMENT:     Normal annual well woman exam with vaginitis  35 y.o. Female; Annual   Diagnosis Orders   1. Pap smear, as part of routine gynecological examination  PAP SMEAR   2. Screening for human papillomavirus  PAP SMEAR   3. Encounter for screening mammogram for breast cancer  MERON DIGITAL SCREEN W OR WO CAD BILATERAL   4. Acute vaginitis  VAGINITIS DNA PROBE                     PLAN:  - Pap collected. Discussed new papsmear guidelines. - Birth control Discussed. - Smoking risk factors Discussed  - Diet and exercise reviewed. - Routine healthmaintenance per patients PCP.  - Return to clinic in 1 year or earlier with questions, problems, concerns. Return for 1 year for Annual and as needed.         Electronically signed by ABEL Paz CNP on 11/30/2021 at 1:32 PM

## 2021-12-01 DIAGNOSIS — N76.0 ACUTE VAGINITIS: ICD-10-CM

## 2021-12-01 LAB
DIRECT EXAM: NORMAL
Lab: NORMAL
SPECIMEN DESCRIPTION: NORMAL

## 2021-12-02 LAB
HPV SAMPLE: NORMAL
HPV, GENOTYPE 16: NOT DETECTED
HPV, GENOTYPE 18: NOT DETECTED
HPV, HIGH RISK OTHER: NOT DETECTED
HPV, INTERPRETATION: NORMAL
SPECIMEN DESCRIPTION: NORMAL

## 2021-12-07 LAB — CYTOLOGY REPORT: NORMAL

## 2022-01-17 ENCOUNTER — HOSPITAL ENCOUNTER (OUTPATIENT)
Dept: WOMENS IMAGING | Age: 42
Discharge: HOME | End: 2022-01-19
Payer: COMMERCIAL

## 2022-01-17 ENCOUNTER — OFFICE VISIT (OUTPATIENT)
Dept: PODIATRY | Age: 42
End: 2022-01-17

## 2022-01-17 VITALS — WEIGHT: 133 LBS | BODY MASS INDEX: 23.57 KG/M2 | HEIGHT: 63 IN

## 2022-01-17 DIAGNOSIS — M20.11 HALLUX ABDUCTOVALGUS, RIGHT: Primary | ICD-10-CM

## 2022-01-17 DIAGNOSIS — Z12.31 ENCOUNTER FOR SCREENING MAMMOGRAM FOR BREAST CANCER: ICD-10-CM

## 2022-01-17 PROCEDURE — 77063 BREAST TOMOSYNTHESIS BI: CPT

## 2022-07-27 RX ORDER — FLUCONAZOLE 100 MG/1
100 TABLET ORAL DAILY
Qty: 7 TABLET | Refills: 0 | Status: SHIPPED | OUTPATIENT
Start: 2022-07-27 | End: 2022-08-03

## 2022-10-05 ENCOUNTER — NURSE ONLY (OUTPATIENT)
Dept: PODIATRY | Age: 42
End: 2022-10-05

## 2023-01-26 ENCOUNTER — OFFICE VISIT (OUTPATIENT)
Dept: OBGYN CLINIC | Age: 43
End: 2023-01-26
Payer: COMMERCIAL

## 2023-01-26 VITALS
BODY MASS INDEX: 23.39 KG/M2 | HEIGHT: 63 IN | HEART RATE: 96 BPM | DIASTOLIC BLOOD PRESSURE: 72 MMHG | WEIGHT: 132 LBS | SYSTOLIC BLOOD PRESSURE: 118 MMHG

## 2023-01-26 DIAGNOSIS — N92.6 IRREGULAR MENSES: ICD-10-CM

## 2023-01-26 DIAGNOSIS — Z30.431 IUD CHECK UP: ICD-10-CM

## 2023-01-26 DIAGNOSIS — Z01.419 WELL WOMAN EXAM: Primary | ICD-10-CM

## 2023-01-26 DIAGNOSIS — Z12.11 SCREEN FOR COLON CANCER: ICD-10-CM

## 2023-01-26 DIAGNOSIS — Z12.31 SCREENING MAMMOGRAM FOR BREAST CANCER: ICD-10-CM

## 2023-01-26 PROCEDURE — 99396 PREV VISIT EST AGE 40-64: CPT | Performed by: CLINICAL NURSE SPECIALIST

## 2023-01-26 PROCEDURE — G8484 FLU IMMUNIZE NO ADMIN: HCPCS | Performed by: CLINICAL NURSE SPECIALIST

## 2023-01-26 RX ORDER — RIZATRIPTAN BENZOATE 10 MG/1
10 TABLET ORAL
COMMUNITY
Start: 2023-01-16

## 2023-01-26 RX ORDER — ATOGEPANT 60 MG/1
TABLET ORAL
COMMUNITY
Start: 2023-01-19

## 2023-01-26 SDOH — ECONOMIC STABILITY: FOOD INSECURITY: WITHIN THE PAST 12 MONTHS, YOU WORRIED THAT YOUR FOOD WOULD RUN OUT BEFORE YOU GOT MONEY TO BUY MORE.: NEVER TRUE

## 2023-01-26 SDOH — ECONOMIC STABILITY: FOOD INSECURITY: WITHIN THE PAST 12 MONTHS, THE FOOD YOU BOUGHT JUST DIDN'T LAST AND YOU DIDN'T HAVE MONEY TO GET MORE.: NEVER TRUE

## 2023-01-26 ASSESSMENT — PATIENT HEALTH QUESTIONNAIRE - PHQ9
SUM OF ALL RESPONSES TO PHQ QUESTIONS 1-9: 0
SUM OF ALL RESPONSES TO PHQ9 QUESTIONS 1 & 2: 0
1. LITTLE INTEREST OR PLEASURE IN DOING THINGS: 0
SUM OF ALL RESPONSES TO PHQ QUESTIONS 1-9: 0
SUM OF ALL RESPONSES TO PHQ QUESTIONS 1-9: 0
2. FEELING DOWN, DEPRESSED OR HOPELESS: 0
SUM OF ALL RESPONSES TO PHQ QUESTIONS 1-9: 0

## 2023-01-26 ASSESSMENT — SOCIAL DETERMINANTS OF HEALTH (SDOH): HOW HARD IS IT FOR YOU TO PAY FOR THE VERY BASICS LIKE FOOD, HOUSING, MEDICAL CARE, AND HEATING?: NOT HARD AT ALL

## 2023-01-26 NOTE — PROGRESS NOTES
[facility] GYN  [facility]  [address] [place] [id number]  Dept: [phone]        DATE OF VISIT:  23        History and Physical    Frankie Pepe    :  1980  CHIEF COMPLAINT:    Chief Complaint   Patient presents with    Annual Exam                    Frankie Pepe is a 37 y.o. female who presents for annual well woman exam.  Patient reports that she has been having irregular menses her last cycle was in sept. Of last year but also reports that she has had a lot of stress. The patient was seen and examined. Per the patient bowels areregular. She has no voiding complaints. She denies any bloating as well as vaginal discharge. Chaperone for Intimate Exam  Chaperone was offered as part of the rooming process. Patient declined and agrees to continue with exam without a chaperone.   Chaperone: none     _____________________________________________________________________  Past Medical History:   Diagnosis Date    Chronic headaches     Hallux valgus of right foot     Prolonged emergence from general anesthesia     AS A CHILD AFTER TONSILLECTOMY    Sesamoiditis     right foot                                                                   Past Surgical History:   Procedure Laterality Date    BUNIONECTOMY Right 10/5/15    silver; tibial sesamoidectomy right foot    BUNIONECTOMY Right 2017    marcela,akin and avulsion of right great toe nail    NERVE BLOCK      had ten done multiple dates, young age, right arm    OTHER SURGICAL HISTORY  2015    IUD removal    TOENAIL EXCISION Bilateral years ago    Hallux nail, great toes    TONSILLECTOMY AND ADENOIDECTOMY      TONSILLECTOMY AND ADENOIDECTOMY       Family History   Problem Relation Age of Onset    Colon Polyps Mother     Emphysema Father     Diabetes Maternal Grandmother     High Blood Pressure Maternal Grandmother     Diabetes Paternal Grandfather     High Blood Pressure Paternal Grandfather     Cancer Paternal Grandfather         unsure of type    Breast Cancer Maternal Aunt     Colon Cancer Maternal Aunt      Social History     Tobacco Use   Smoking Status Never   Smokeless Tobacco Never     Social History     Substance and Sexual Activity   Alcohol Use Yes    Comment: OCCASIONALLY     Current Outpatient Medications   Medication Sig Dispense Refill    QULIPTA 60 MG TABS TAKE 1 TABLET BY MOUTH IN THE MORNING      rizatriptan (MAXALT) 10 MG tablet Take 10 mg by mouth once as needed      PARAGARD INTRAUTERINE COPPER IUD by Intrauterine route       Current Facility-Administered Medications   Medication Dose Route Frequency Provider Last Rate Last Admin    bupivacaine (MARCAINE) 0.5 % injection 5 mg  1 mL IntraDERmal Once Saumya Cart, DPM        Dexamethasone Sodium Phosphate injection 6 mg  6 mg Intra-artICUlar Once Saumya Cart, DPM        MethylPREDNISolone Acetate SUSP 10 mg  10 mg Injection Once Saumya Cart, DPM           Allergies: Allergies   Allergen Reactions    Bactrim [Sulfamethoxazole-Trimethoprim]     Hibiclens [Chlorhexidine Gluconate] Hives    Minocycline Hives    Penicillins     Iodine Itching and Rash       Gynecologic History:  Patient's last menstrual period was 2022.   Sexually Active: Yes  STD History:No  Birth Control: Yes IUD     OB History    Para Term  AB Living   4 4 2 2 0 2   SAB IAB Ectopic Molar Multiple Live Births   0 0 0 0 0 2     ______________________________________________________________________    Review of Systems    REVIEW OFSYSTEMS:        Constitutional:  Unexpected weight change, extreme fatigue, night sweats              no  Skin:                           Rashes, moles   no  Neurological:  Frequent headaches hx of migraines and sees Dr. Liane Corbin, seizures         yes  Ophthalmic:  Recent visual changes no  ENT:   Difficulty swallowing  no  Breast:              Masses, pain, nipple discharge no     Respiratory:  Shortness of breath, coughing           no    Cardiovascular: Chest pain   no     Gastrointestinal: Chronic diarrhea/constipation, nausea/vomiting           no   Urogenital:  Urinary incontinence, frequency, urgency          no                                         Heavy/irregular periods       has not had a menses since aug. 2022    yes                                      Vaginal discharge                   no  Hematological: Bruises easy   no     Endocrine:  Hot flashes   no     Hot/Cold Intolerance  no    Psychological:            Mood and affect were within normal limits. yes                 Physical Exam    Physical Exam:    Vitals:    01/26/23 0739   BP: 118/72   Pulse: 96   Weight: 132 lb (59.9 kg)   Height: 5' 3\" (1.6 m)       General Appearance: This  is a well developed, well nourished, well groomed female. Her BMI was reviewed. Nutritional decision making andexercise were discussed. Neurological:  The patient is alert and oriented to time,place, person, and situation    Skin:  A brief inspection of the skin revealed no rashes or lesions. Neck:  The neck was supple. Respiratory: There was unlabored respiratory effort. Lungs clear to ascultation. Cardiovascular: The patients extremities were without calf tenderness or edema. Heart with a regular rate and rhythm. Abdomen: The abdomen was soft and non-tender with no guarding, rebound or rigidity. No hernias were appreciated. Breast:   The patients breasts were symmetrical.  There were no masses, discharge or retractions noted. Self breast exams were reviewed. Pelvic Exam:  The external genitalia was with a normal appearance. The vaginal vault was normal. There were no cystocele, rectocele, or enterocele appreciated. There was no vaginal discharge. The cervix was without lesions. There was no cervical motion tenderness. IUD strings noted.      The uterus was mobile, midline and regular. The adnexa no fullness, tenderness or masses appreciated. ASSESSMENT: Normal annual well woman exam    37 y.o. Female; Annual   Diagnosis Orders   1. Well woman exam        2. Screening mammogram for breast cancer  MERON DIGITAL SCREEN W OR WO CAD BILATERAL      3. Irregular menses        4. Screen for colon cancer  Fecal DNA Colorectal cancer screening (Cologuard)      5. IUD check up                      PLAN:  - Discussed new papsmear guidelines. - Birth control Discussed. Paragard good until 2025  - Smoking risk factors Discussed  - Diet and exercise reviewed. - Routine healthmaintenance per patients PCP.  - Return to clinic in 1 year or earlier with questions, problems, concerns. Return for 1 year for Annual and as needed.         Electronically signed by ABEL Moreno CNP on 1/26/2023 at 8:06 AM

## 2023-02-09 ENCOUNTER — HOSPITAL ENCOUNTER (OUTPATIENT)
Dept: WOMENS IMAGING | Age: 43
Discharge: HOME | End: 2023-02-11
Payer: COMMERCIAL

## 2023-02-09 DIAGNOSIS — Z12.31 SCREENING MAMMOGRAM FOR BREAST CANCER: ICD-10-CM

## 2023-02-09 PROCEDURE — 77067 SCR MAMMO BI INCL CAD: CPT

## 2023-03-15 ENCOUNTER — OFFICE VISIT (OUTPATIENT)
Dept: PODIATRY | Age: 43
End: 2023-03-15
Payer: COMMERCIAL

## 2023-03-15 VITALS — BODY MASS INDEX: 23.39 KG/M2 | HEIGHT: 63 IN | WEIGHT: 132 LBS

## 2023-03-15 DIAGNOSIS — M79.671 RIGHT FOOT PAIN: Primary | ICD-10-CM

## 2023-03-15 DIAGNOSIS — M19.071 OSTEOARTHRITIS OF FIRST METATARSOPHALANGEAL (MTP) JOINT OF RIGHT FOOT: ICD-10-CM

## 2023-03-15 DIAGNOSIS — M20.11 HALLUX ABDUCTO VALGUS, RIGHT: ICD-10-CM

## 2023-03-15 PROCEDURE — 99203 OFFICE O/P NEW LOW 30 MIN: CPT | Performed by: PODIATRIST

## 2023-03-15 PROCEDURE — 1036F TOBACCO NON-USER: CPT | Performed by: PODIATRIST

## 2023-03-15 PROCEDURE — G8420 CALC BMI NORM PARAMETERS: HCPCS | Performed by: PODIATRIST

## 2023-03-15 PROCEDURE — G8484 FLU IMMUNIZE NO ADMIN: HCPCS | Performed by: PODIATRIST

## 2023-03-15 PROCEDURE — G8427 DOCREV CUR MEDS BY ELIG CLIN: HCPCS | Performed by: PODIATRIST

## 2023-03-15 RX ORDER — CEPHALEXIN 500 MG/1
CAPSULE ORAL 3 TIMES DAILY
COMMUNITY
Start: 2023-03-14 | End: 2023-03-21

## 2023-03-15 ASSESSMENT — ENCOUNTER SYMPTOMS
NAUSEA: 0
COLOR CHANGE: 0
CONSTIPATION: 0
DIARRHEA: 0
VOMITING: 0

## 2023-04-26 ENCOUNTER — OFFICE VISIT (OUTPATIENT)
Dept: PODIATRY | Age: 43
End: 2023-04-26
Payer: COMMERCIAL

## 2023-04-26 VITALS — HEIGHT: 63 IN | WEIGHT: 132 LBS | BODY MASS INDEX: 23.39 KG/M2

## 2023-04-26 DIAGNOSIS — M19.071 OSTEOARTHRITIS OF FIRST METATARSOPHALANGEAL (MTP) JOINT OF RIGHT FOOT: ICD-10-CM

## 2023-04-26 DIAGNOSIS — M20.11 HALLUX ABDUCTO VALGUS, RIGHT: Primary | ICD-10-CM

## 2023-04-26 DIAGNOSIS — M72.2 PLANTAR FASCIITIS, RIGHT: ICD-10-CM

## 2023-04-26 DIAGNOSIS — M79.671 RIGHT FOOT PAIN: ICD-10-CM

## 2023-04-26 PROCEDURE — G8427 DOCREV CUR MEDS BY ELIG CLIN: HCPCS | Performed by: PODIATRIST

## 2023-04-26 PROCEDURE — 99213 OFFICE O/P EST LOW 20 MIN: CPT | Performed by: PODIATRIST

## 2023-04-26 PROCEDURE — 1036F TOBACCO NON-USER: CPT | Performed by: PODIATRIST

## 2023-04-26 PROCEDURE — G8420 CALC BMI NORM PARAMETERS: HCPCS | Performed by: PODIATRIST

## 2023-04-26 ASSESSMENT — ENCOUNTER SYMPTOMS
NAUSEA: 0
VOMITING: 0
COLOR CHANGE: 0
CONSTIPATION: 0
DIARRHEA: 0

## 2023-04-26 NOTE — PROGRESS NOTES
[facility]  Return Patient History and Physical      Chief Complaint   Patient presents with    Foot Pain     Right foot pain       Nima Montano is a 37y.o. year old female who is here for follow up of right foot. She is doing better with the otc inserts but they are wearing out already. Having some heel pain also. Carmela Flores She has been having worsening pain around the right first mpj. The Powersteps have helped in the past with good shoes. Pain on top and bottom. Using topical. Feels big toe is drifting more. Rubs on 2nd toe. She had Christophe Brooklyn and Maynor right. With a long recovery. Vital signs are stable. Also had medial sesamoid removed before that due to pain and fracture. Patient denies N/V/F/C. Went through PT    Review of Systems   Constitutional:  Negative for chills, diaphoresis, fatigue, fever and unexpected weight change. Cardiovascular:  Negative for leg swelling. Gastrointestinal:  Negative for constipation, diarrhea, nausea and vomiting. Musculoskeletal:  Negative for arthralgias, gait problem and joint swelling. Skin:  Negative for color change, pallor, rash and wound. Neurological:  Negative for weakness and numbness. Vascular:  DP/PT pulses palpable 2/4, Bilateral.    CFT <3 seconds to digits 1-5, Bilateral .   Hair growth present to level of digits, Bilateral.  Edema absent, Right. Erythema absent, Right. Neurological:   Sensation present  to light touch to level of digits, both feet. Musculoskeletal:   Muscle strength is 5/5 on the Right Foot and 5/5 on the Left Foot. Lateral deviation of the hallux on the right, slight medial prominence, no pain with rom first mpj right, but limited in dorsiflexion. Pain dorsal and plantar first mpj with palpation. Integument:  Warm, dry, supple both feet. Radiographs: 3 views right Foot:  increased intermetatarsal angle, abnormal hallux abductus angle and abnormal sesamoid position.  Internal fixation in the

## 2024-02-01 ENCOUNTER — OFFICE VISIT (OUTPATIENT)
Dept: OBGYN CLINIC | Age: 44
End: 2024-02-01
Payer: COMMERCIAL

## 2024-02-01 ENCOUNTER — HOSPITAL ENCOUNTER (OUTPATIENT)
Age: 44
Setting detail: SPECIMEN
Discharge: HOME | End: 2024-02-01

## 2024-02-01 VITALS
HEART RATE: 98 BPM | HEIGHT: 63 IN | SYSTOLIC BLOOD PRESSURE: 122 MMHG | DIASTOLIC BLOOD PRESSURE: 76 MMHG | WEIGHT: 129 LBS | BODY MASS INDEX: 22.86 KG/M2

## 2024-02-01 DIAGNOSIS — Z01.419 ENCOUNTER FOR ANNUAL ROUTINE GYNECOLOGICAL EXAMINATION: Primary | ICD-10-CM

## 2024-02-01 DIAGNOSIS — Z12.31 SCREENING MAMMOGRAM FOR BREAST CANCER: ICD-10-CM

## 2024-02-01 DIAGNOSIS — Z11.51 SCREENING FOR HPV (HUMAN PAPILLOMAVIRUS): ICD-10-CM

## 2024-02-01 DIAGNOSIS — Z30.431 IUD CHECK UP: ICD-10-CM

## 2024-02-01 DIAGNOSIS — Z12.11 SCREENING FOR COLON CANCER: ICD-10-CM

## 2024-02-01 PROCEDURE — 99396 PREV VISIT EST AGE 40-64: CPT | Performed by: CLINICAL NURSE SPECIALIST

## 2024-02-01 PROCEDURE — G8484 FLU IMMUNIZE NO ADMIN: HCPCS | Performed by: CLINICAL NURSE SPECIALIST

## 2024-02-01 RX ORDER — UBROGEPANT 50 MG/1
TABLET ORAL
COMMUNITY

## 2024-02-01 SDOH — ECONOMIC STABILITY: HOUSING INSECURITY
IN THE LAST 12 MONTHS, WAS THERE A TIME WHEN YOU DID NOT HAVE A STEADY PLACE TO SLEEP OR SLEPT IN A SHELTER (INCLUDING NOW)?: NO

## 2024-02-01 SDOH — ECONOMIC STABILITY: INCOME INSECURITY: HOW HARD IS IT FOR YOU TO PAY FOR THE VERY BASICS LIKE FOOD, HOUSING, MEDICAL CARE, AND HEATING?: NOT HARD AT ALL

## 2024-02-01 SDOH — ECONOMIC STABILITY: FOOD INSECURITY: WITHIN THE PAST 12 MONTHS, YOU WORRIED THAT YOUR FOOD WOULD RUN OUT BEFORE YOU GOT MONEY TO BUY MORE.: NEVER TRUE

## 2024-02-01 SDOH — ECONOMIC STABILITY: FOOD INSECURITY: WITHIN THE PAST 12 MONTHS, THE FOOD YOU BOUGHT JUST DIDN'T LAST AND YOU DIDN'T HAVE MONEY TO GET MORE.: NEVER TRUE

## 2024-02-01 ASSESSMENT — PATIENT HEALTH QUESTIONNAIRE - PHQ9
2. FEELING DOWN, DEPRESSED OR HOPELESS: 0
SUM OF ALL RESPONSES TO PHQ QUESTIONS 1-9: 0
1. LITTLE INTEREST OR PLEASURE IN DOING THINGS: 0
SUM OF ALL RESPONSES TO PHQ QUESTIONS 1-9: 0
SUM OF ALL RESPONSES TO PHQ9 QUESTIONS 1 & 2: 0

## 2024-02-01 NOTE — PROGRESS NOTES
Aunt      Social History     Tobacco Use   Smoking Status Never   Smokeless Tobacco Never     Social History     Substance and Sexual Activity   Alcohol Use Yes    Comment: OCCASIONALLY     Current Outpatient Medications   Medication Sig Dispense Refill    Ubrogepant (UBRELVY) 50 MG TABS Take by mouth      QULIPTA 60 MG TABS TAKE 1 TABLET BY MOUTH IN THE MORNING      PARAGARD INTRAUTERINE COPPER IUD by Intrauterine route      rizatriptan (MAXALT) 10 MG tablet Take 10 mg by mouth once as needed (Patient not taking: Reported on 2024)       No current facility-administered medications for this visit.       Allergies:  Allergies   Allergen Reactions    Bactrim [Sulfamethoxazole-Trimethoprim]     Hibiclens [Chlorhexidine Gluconate] Hives    Minocycline Hives    Penicillins     Iodine Itching and Rash       Gynecologic History:  Patient's last menstrual period was 2024.  Sexually Active: Yes  STD History:No  Birth Control: Yes paraguard IUD    OB History    Para Term  AB Living   4 4 2 2 0 2   SAB IAB Ectopic Molar Multiple Live Births   0 0 0 0 0 2     ______________________________________________________________________    Review of Systems    REVIEW OFSYSTEMS:        Constitutional:  Unexpected weight change, extreme fatigue, night sweats              no  Skin:                           Rashes, moles   no  Neurological:  Frequentheadaches, seizures         Migraines, managed by physician  Ophthalmic:  Recent visual changes Yes, reading glasses recently  ENT:   Difficulty swallowing  no  Breast:              Masses, pain, nipple discharge                            no     Respiratory:  Shortness of breath, coughing           no    Cardiovascular: Chest pain   no     Gastrointestinal: Chronic diarrhea/constipation, nausea/vomiting           no   Urogenital:  Urinary incontinence, frequency, urgency          no                                         Heavy/irregular periods           no

## 2024-02-02 ENCOUNTER — TELEPHONE (OUTPATIENT)
Dept: GASTROENTEROLOGY | Age: 44
End: 2024-02-02

## 2024-02-05 LAB
HPV I/H RISK 4 DNA CVX QL NAA+PROBE: NOT DETECTED
HPV SAMPLE: NORMAL
HPV, INTERPRETATION: NORMAL
HPV16 DNA CVX QL NAA+PROBE: NOT DETECTED
HPV18 DNA CVX QL NAA+PROBE: NOT DETECTED
SPECIMEN DESCRIPTION: NORMAL

## 2024-02-12 ENCOUNTER — HOSPITAL ENCOUNTER (OUTPATIENT)
Dept: WOMENS IMAGING | Age: 44
Discharge: HOME | End: 2024-02-14
Payer: COMMERCIAL

## 2024-02-12 VITALS — BODY MASS INDEX: 23.74 KG/M2 | HEIGHT: 62 IN | WEIGHT: 129 LBS

## 2024-02-12 DIAGNOSIS — Z12.31 SCREENING MAMMOGRAM FOR BREAST CANCER: ICD-10-CM

## 2024-02-12 PROCEDURE — 77063 BREAST TOMOSYNTHESIS BI: CPT

## 2024-03-07 LAB — CYTOLOGY REPORT: NORMAL

## 2024-09-30 ENCOUNTER — HOSPITAL ENCOUNTER (OUTPATIENT)
Age: 44
Setting detail: OUTPATIENT SURGERY
Discharge: HOME | End: 2024-09-30
Attending: INTERNAL MEDICINE | Admitting: INTERNAL MEDICINE
Payer: COMMERCIAL

## 2024-09-30 ENCOUNTER — ANESTHESIA EVENT (OUTPATIENT)
Dept: OPERATING ROOM | Age: 44
End: 2024-09-30
Payer: COMMERCIAL

## 2024-09-30 ENCOUNTER — ANESTHESIA (OUTPATIENT)
Dept: OPERATING ROOM | Age: 44
End: 2024-09-30
Payer: COMMERCIAL

## 2024-09-30 VITALS
RESPIRATION RATE: 24 BRPM | HEIGHT: 62 IN | OXYGEN SATURATION: 100 % | SYSTOLIC BLOOD PRESSURE: 94 MMHG | HEART RATE: 77 BPM | WEIGHT: 124.5 LBS | DIASTOLIC BLOOD PRESSURE: 79 MMHG | BODY MASS INDEX: 22.91 KG/M2 | TEMPERATURE: 98.2 F

## 2024-09-30 LAB — HCG, PREGNANCY URINE (POC): NEGATIVE

## 2024-09-30 PROCEDURE — 3609027000 HC COLONOSCOPY: Performed by: INTERNAL MEDICINE

## 2024-09-30 PROCEDURE — 3700000001 HC ADD 15 MINUTES (ANESTHESIA): Performed by: INTERNAL MEDICINE

## 2024-09-30 PROCEDURE — 6360000002 HC RX W HCPCS: Performed by: NURSE ANESTHETIST, CERTIFIED REGISTERED

## 2024-09-30 PROCEDURE — 7100000011 HC PHASE II RECOVERY - ADDTL 15 MIN: Performed by: INTERNAL MEDICINE

## 2024-09-30 PROCEDURE — 2500000003 HC RX 250 WO HCPCS: Performed by: NURSE ANESTHETIST, CERTIFIED REGISTERED

## 2024-09-30 PROCEDURE — 2580000003 HC RX 258: Performed by: ANESTHESIOLOGY

## 2024-09-30 PROCEDURE — 7100000010 HC PHASE II RECOVERY - FIRST 15 MIN: Performed by: INTERNAL MEDICINE

## 2024-09-30 PROCEDURE — 45378 DIAGNOSTIC COLONOSCOPY: CPT | Performed by: INTERNAL MEDICINE

## 2024-09-30 PROCEDURE — 3700000000 HC ANESTHESIA ATTENDED CARE: Performed by: INTERNAL MEDICINE

## 2024-09-30 PROCEDURE — 2709999900 HC NON-CHARGEABLE SUPPLY: Performed by: INTERNAL MEDICINE

## 2024-09-30 PROCEDURE — 81025 URINE PREGNANCY TEST: CPT

## 2024-09-30 RX ORDER — SODIUM CHLORIDE 9 MG/ML
INJECTION, SOLUTION INTRAVENOUS CONTINUOUS
Status: DISCONTINUED | OUTPATIENT
Start: 2024-09-30 | End: 2024-09-30

## 2024-09-30 RX ORDER — SODIUM CHLORIDE 9 MG/ML
INJECTION, SOLUTION INTRAVENOUS PRN
Status: DISCONTINUED | OUTPATIENT
Start: 2024-09-30 | End: 2024-09-30 | Stop reason: HOSPADM

## 2024-09-30 RX ORDER — SODIUM CHLORIDE 0.9 % (FLUSH) 0.9 %
5-40 SYRINGE (ML) INJECTION PRN
Status: DISCONTINUED | OUTPATIENT
Start: 2024-09-30 | End: 2024-09-30 | Stop reason: HOSPADM

## 2024-09-30 RX ORDER — LIDOCAINE HYDROCHLORIDE 10 MG/ML
1 INJECTION, SOLUTION EPIDURAL; INFILTRATION; INTRACAUDAL; PERINEURAL
Status: DISCONTINUED | OUTPATIENT
Start: 2024-10-01 | End: 2024-09-30 | Stop reason: HOSPADM

## 2024-09-30 RX ORDER — SODIUM CHLORIDE 0.9 % (FLUSH) 0.9 %
5-40 SYRINGE (ML) INJECTION EVERY 12 HOURS SCHEDULED
Status: DISCONTINUED | OUTPATIENT
Start: 2024-09-30 | End: 2024-09-30 | Stop reason: HOSPADM

## 2024-09-30 RX ORDER — PROPOFOL 10 MG/ML
INJECTION, EMULSION INTRAVENOUS
Status: DISCONTINUED | OUTPATIENT
Start: 2024-09-30 | End: 2024-09-30 | Stop reason: SDUPTHER

## 2024-09-30 RX ORDER — SODIUM CHLORIDE, SODIUM LACTATE, POTASSIUM CHLORIDE, CALCIUM CHLORIDE 600; 310; 30; 20 MG/100ML; MG/100ML; MG/100ML; MG/100ML
INJECTION, SOLUTION INTRAVENOUS CONTINUOUS
Status: DISCONTINUED | OUTPATIENT
Start: 2024-09-30 | End: 2024-09-30 | Stop reason: HOSPADM

## 2024-09-30 RX ORDER — LIDOCAINE HYDROCHLORIDE 10 MG/ML
INJECTION, SOLUTION EPIDURAL; INFILTRATION; INTRACAUDAL; PERINEURAL
Status: DISCONTINUED | OUTPATIENT
Start: 2024-09-30 | End: 2024-09-30 | Stop reason: SDUPTHER

## 2024-09-30 RX ADMIN — PROPOFOL 50 MG: 10 INJECTION, EMULSION INTRAVENOUS at 08:44

## 2024-09-30 RX ADMIN — PROPOFOL 30 MG: 10 INJECTION, EMULSION INTRAVENOUS at 08:41

## 2024-09-30 RX ADMIN — PROPOFOL 20 MG: 10 INJECTION, EMULSION INTRAVENOUS at 08:55

## 2024-09-30 RX ADMIN — LIDOCAINE HYDROCHLORIDE 50 MG: 10 INJECTION, SOLUTION EPIDURAL; INFILTRATION; INTRACAUDAL; PERINEURAL at 08:39

## 2024-09-30 RX ADMIN — PROPOFOL 50 MG: 10 INJECTION, EMULSION INTRAVENOUS at 08:43

## 2024-09-30 RX ADMIN — PROPOFOL 50 MG: 10 INJECTION, EMULSION INTRAVENOUS at 08:50

## 2024-09-30 RX ADMIN — PROPOFOL 50 MG: 10 INJECTION, EMULSION INTRAVENOUS at 08:53

## 2024-09-30 RX ADMIN — PROPOFOL 70 MG: 10 INJECTION, EMULSION INTRAVENOUS at 08:39

## 2024-09-30 RX ADMIN — SODIUM CHLORIDE, POTASSIUM CHLORIDE, SODIUM LACTATE AND CALCIUM CHLORIDE: 600; 310; 30; 20 INJECTION, SOLUTION INTRAVENOUS at 07:17

## 2024-09-30 RX ADMIN — PROPOFOL 50 MG: 10 INJECTION, EMULSION INTRAVENOUS at 08:47

## 2024-09-30 ASSESSMENT — PAIN - FUNCTIONAL ASSESSMENT
PAIN_FUNCTIONAL_ASSESSMENT: 0-10
PAIN_FUNCTIONAL_ASSESSMENT: FACE, LEGS, ACTIVITY, CRY, AND CONSOLABILITY (FLACC)

## 2024-09-30 NOTE — OP NOTE
PROCEDURE NOTE    DATE OF PROCEDURE: 9/30/2024    SURGEON: Aydin Bundy MD    ASSISTANT: None    PREOPERATIVE DIAGNOSIS: SCREENING    POSTOPERATIVE DIAGNOSIS: as described below    OPERATION: Total colonoscopy     ANESTHESIA: MAC PER ANESTHESIA     ESTIMATED BLOOD LOSS: less than 50     COMPLICATIONS: None.     SPECIMENS:  Was Not Obtained    HISTORY: The patient is a 44 y.o. year old female with history of above preop diagnosis.  I recommended colonoscopy with possible biopsy or polypectomy and I explained the risk, benefits, expected outcome, and alternatives to the procedure.  Risks included but are not limited to bleeding, infection, respiratory distress, hypotension, and perforation of the colon and possibility of missing a lesion.  The patient understands and is in agreement.      PROCEDURE: The patient was given IV conscious sedation.  The patient's SPO2 remained above 90% throughout the procedure. The colonoscope was inserted per rectum and advanced under direct vision to the cecum without difficulty.  The prep was good.    SMALL AMOUNT OF PASTY STOOLS  Findings:  Terminal ileum: normal    Cecum/Ascending colon: normal    Transverse colon: normal    Descending/Sigmoid colon: abnormal: FEW SCATTERED DIVERTICULI    Rectum/Anus: examined in normal and retroflexed positions and was abnormal: INT HEMORRHOIDS    Withdrawal Time was (minutes): 13    The colon was decompressed and the scope was removed.  The patient tolerated the procedure well.     Recommendations/Plan:   Lifestyle and dietary modifications as discussed  F/U In Office in 3-4 weeks  Discussed with the family  Colonoscopy Recall :10 year  POST SEDATION PATIENT WAS STABLE WITH STABLE VITAL SIGNS AND OXYGEN SATURATIONS AND WAS DISCHARGED HOME WITH RIDE IN A STABLE CONDITION.    Electronically signed by Aydin Bundy MD  on 9/30/2024 at 8:58 AM

## 2024-09-30 NOTE — ANESTHESIA PRE PROCEDURE
Department of Anesthesiology  Preprocedure Note       Name:  Rebekah Otto   Age:  44 y.o.  :  1980                                          MRN:  [id number]         Date:  2024      Surgeon: Surgeon(s):  Aydin Bundy MD    Procedure: Procedure(s):  COLORECTAL CANCER SCREENING, NOT HIGH RISK    Medications prior to admission:   Prior to Admission medications    Medication Sig Start Date End Date Taking? Authorizing Provider   polyethylene glycol (GLYCOLAX) 17 GM/SCOOP powder Follow instructions given by provider 24  Yes Aydin Bundy MD   bisacodyl 5 MG EC tablet Use as instructed from your physicians office for your colonoscopy prep. 24  Yes Aydin Bundy MD   PARAGARD INTRAUTERINE COPPER IUD by Intrauterine route   Yes ProviderKayla MD   Ubrogepant (UBRELVY) 50 MG TABS Take by mouth as needed    Kayla Webster MD   QULIPTA 60 MG TABS as needed 23   ProviderKayla MD       Current medications:    Current Facility-Administered Medications   Medication Dose Route Frequency Provider Last Rate Last Admin    [START ON 10/1/2024] lidocaine PF 1 % injection 1 mL  1 mL IntraDERmal Once PRN Lon Domingo MD        lactated ringers IV soln infusion   IntraVENous Continuous Lon Domingo MD        sodium chloride flush 0.9 % injection 5-40 mL  5-40 mL IntraVENous 2 times per day Lon Domingo MD        sodium chloride flush 0.9 % injection 5-40 mL  5-40 mL IntraVENous PRN Lon Domingo MD        0.9 % sodium chloride infusion   IntraVENous PRN Lon Domingo MD           Allergies:    Allergies   Allergen Reactions    Bactrim [Sulfamethoxazole-Trimethoprim]     Hibiclens [Chlorhexidine Gluconate] Hives    Minocycline Hives    Penicillins     Iodine Itching and Rash       Problem List:    Patient Active Problem List   Diagnosis Code    IUD threads lost T83.32XA    Hallux abducto valgus M20.10    Pain in

## 2024-09-30 NOTE — DISCHARGE INSTRUCTIONS
Colonoscopy: What to Expect at Home  Your Recovery  Your doctor will talk to you about when you will need your next colonoscopy. The results of your test and your risk for colorectal cancer will help your doctor decide how often you need to be checked.  After the test, you may be bloated or have gas pains. You may need to pass gas. If a biopsy was done or a polyp was removed, you may have streaks of blood in your stool (feces) for a few days.    How can you care for yourself at home?  Activity  Rest as much as you need to after you go home.  You should be able to go back to your usual activities the day after the test.  Diet  Follow your doctor’s directions for eating.  Drink plenty of fluids (unless your doctor has told you not to) to replace the fluids that were lost during the colon prep.  Medicines  If polyps were removed or a biopsy was done during the test, your doctor may tell you not to take aspirin or other anti-inflammatory medicines, such as ibuprofen (Advil, Motrin) and naproxen (Aleve), for a few days.  Follow-up care is a key part of your treatment and safety. Be sure to make and go to all appointments, and call your doctor if you are having problems.  When should you call for help?  Call 911 anytime you think you may need emergency care. For example, call if:  You passed out (lost consciousness).  You pass maroon or bloody stools.  You have severe belly pain.  Call your doctor now or seek immediate medical care if:  Your stools are black and tarlike.  Your stools have streaks of blood, but you did not have a biopsy or any polyps removed.  You have belly pain, or your belly is swollen and firm.  You vomit.  You have a fever.  You are very dizzy.  Watch closely for changes in your health, and be sure to contact your doctor if you have any problems.   Where can you learn more?   Go to [email or web address] and sign in to your [employer] account. Enter E264 in the Search [employer]  Information box to learn more about “Colonoscopy: What to Expect at Home.”       © 4590-9728 [employer]. Care instructions adapted under license by [facility]. This care instruction is for use with your licensed healthcare professional. If you have questions about a medical condition or this instruction, always ask your healthcare professional. [employer] disclaims any warranty or liability for your use of this information.  Content Version: 9.9.024203; Last Revised: February 20, 2013

## 2024-09-30 NOTE — H&P
History and Physical Service   [facility]    HISTORY AND PHYSICAL EXAMINATION            Date of Evaluation: 9/30/2024  Patient name:  Rebekah Otto  MRN:   [id number]  YOB: 1980  PCP:    Ana Laura Armstrong MD    History Obtained From:     Patient, medical records    History of Present Illness:     This is Rebekah Otto a 44 y.o. female who presents today for a COLORECTAL CANCER SCREENING, NOT HIGH RISK by Aydin Bundy MD for Screening for colon cancer. Patient denies bowel changes. She denies bloody tarry stools, diarrhea alternating with constipation, nausea, vomiting, abdominal pain or unintentional weight loss. Patient followed bowel prep until watery clear. Has not had previous colonoscopy. No FH colon cancer or polyps. Denies fever, chills, shortness of breath, cough, congestion, wheezing, chest pain, open sores or wounds. Denies hx of diabetes. Denies any current blood thinning medications.     Past Medical History:     Past Medical History:   Diagnosis Date    Hallux valgus of right foot     Migraines     Prolonged emergence from general anesthesia     AS A CHILD AFTER TONSILLECTOMY    Sesamoiditis     right foot        Past Surgical History:     Past Surgical History:   Procedure Laterality Date    BUNIONECTOMY Right 10/05/2015    silver; tibial sesamoidectomy right foot    BUNIONECTOMY Right 01/23/2017    marcela,akin and avulsion of right great toe nail    NERVE BLOCK      had ten done multiple dates, young age, right arm    OTHER SURGICAL HISTORY  02/2015    IUD removal    TOENAIL EXCISION Bilateral years ago    Hallux nail, great toes    TONSILLECTOMY AND ADENOIDECTOMY          Medications Prior to Admission:     Prior to Admission medications    Medication Sig Start Date End Date Taking? Authorizing Provider   polyethylene glycol (GLYCOLAX) 17 GM/SCOOP powder Follow instructions given by provider 4/16/24  Yes Aydin Bundy MD   bisacodyl 5 MG EC tablet Use as  instructed from your physicians office for your colonoscopy prep. 4/16/24  Yes Aydin Bundy MD   PARAGARD INTRAUTERINE COPPER IUD by Intrauterine route   Yes ProviderKayla MD   Ubrogepant (UBRELVY) 50 MG TABS Take by mouth as needed    Kayla Webster MD   QULIPTA 60 MG TABS as needed 1/19/23   Provider, MD Kayla        Allergies:     Bactrim [sulfamethoxazole-trimethoprim], Hibiclens [chlorhexidine gluconate], Minocycline, Penicillins, and Iodine    Social History:     Tobacco:    reports that she has never smoked. She has never used smokeless tobacco.  Alcohol:      reports current alcohol use.  Drug Use:  reports no history of drug use.    Family History:     Family History   Problem Relation Age of Onset    Colon Polyps Mother     Emphysema Father     Diabetes Maternal Grandmother     High Blood Pressure Maternal Grandmother     Diabetes Paternal Grandfather     High Blood Pressure Paternal Grandfather     Cancer Paternal Grandfather         unsure of type    Breast Cancer Maternal Aunt     Colon Cancer Maternal Aunt        Review of Systems:     Positive and Negative as described in HPI.    CONSTITUTIONAL: negative for fevers, chills, sweats, fatigue, weight loss  HEENT: negative for vision, hearing changes, runny nose, throat pain  RESPIRATORY: negative for shortness of breath, cough, congestion, wheezing.  CARDIOVASCULAR: negative for chest pain, palpitations.  GASTROINTESTINAL: See HPI  GENITOURINARY: negative for difficulty of urination, burning with urination, frequency   INTEGUMENT: negative for rash, skin lesions, easy bruising   HEMATOLOGIC/LYMPHATIC: negative for swelling/edema   ALLERGIC/IMMUNOLOGIC: negative for urticaria , itching  ENDOCRINE: negative increase in drinking, increase in urination, hot or cold intolerance  MUSCULOSKELETAL: negative joint pains, muscle aches, swelling of joints  NEUROLOGICAL: Migraines negative for dizziness, lightheadedness, numbness, pain,

## 2024-09-30 NOTE — ANESTHESIA POSTPROCEDURE EVALUATION
Department of Anesthesiology  Postprocedure Note    Patient: Rebekah Otto  MRN: [id number]  YOB: 1980  Date of evaluation: 9/30/2024    Procedure Summary       Date: 09/30/24 Room / Location: [address]    Anesthesia Start: 0827 Anesthesia Stop: 0904    Procedure: COLORECTAL CANCER SCREENING, NOT HIGH RISK Diagnosis:       Screening for colon cancer      (Screening for colon cancer [Z12.11])    Surgeons: Aydin Bundy MD Responsible Provider: Claude Buckner MD    Anesthesia Type: MAC ASA Status: 1            Anesthesia Type: No value filed.    Sabina Phase I: Sabina Score: 10    Sabina Phase II: Sabina Score: 10    Anesthesia Post Evaluation    Airway patency: patent  Cardiovascular status: hemodynamically stable  Respiratory status: acceptable    No notable events documented.

## 2025-01-28 ENCOUNTER — TELEPHONE (OUTPATIENT)
Dept: PODIATRY | Age: 45
End: 2025-01-28

## 2025-01-28 NOTE — TELEPHONE ENCOUNTER
Pt would like to know if \"power step inserts\" are available to  in the office. Please call her to advise.

## 2025-02-18 ENCOUNTER — HOSPITAL ENCOUNTER (OUTPATIENT)
Dept: WOMENS IMAGING | Age: 45
Discharge: HOME | End: 2025-02-20
Payer: COMMERCIAL

## 2025-02-18 VITALS — WEIGHT: 124 LBS | BODY MASS INDEX: 22.82 KG/M2 | HEIGHT: 62 IN

## 2025-02-18 DIAGNOSIS — Z12.31 VISIT FOR SCREENING MAMMOGRAM: ICD-10-CM

## 2025-02-18 PROCEDURE — 77063 BREAST TOMOSYNTHESIS BI: CPT

## (undated) DEVICE — GLOVE SURG 8 11.7IN BEAD CUF LIGHT BRN SENSICARE LTX FREE

## (undated) DEVICE — STAZ ENDO KIT: Brand: MEDLINE INDUSTRIES, INC.